# Patient Record
Sex: FEMALE | Race: WHITE | Employment: FULL TIME | URBAN - METROPOLITAN AREA
[De-identification: names, ages, dates, MRNs, and addresses within clinical notes are randomized per-mention and may not be internally consistent; named-entity substitution may affect disease eponyms.]

---

## 2019-08-25 ENCOUNTER — HOSPITAL ENCOUNTER (EMERGENCY)
Facility: CLINIC | Age: 24
Discharge: HOME | End: 2019-08-25
Attending: EMERGENCY MEDICINE | Admitting: EMERGENCY MEDICINE
Payer: COMMERCIAL

## 2019-08-25 VITALS
WEIGHT: 190 LBS | HEIGHT: 70 IN | OXYGEN SATURATION: 97 % | BODY MASS INDEX: 27.2 KG/M2 | TEMPERATURE: 98.2 F | DIASTOLIC BLOOD PRESSURE: 90 MMHG | SYSTOLIC BLOOD PRESSURE: 141 MMHG | RESPIRATION RATE: 20 BRPM
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
## 2019-08-25 PROCEDURE — 99283 EMERGENCY DEPT VISIT LOW MDM: CPT

## 2019-08-25 PROCEDURE — 12002 RPR S/N/AX/GEN/TRNK2.6-7.5CM: CPT

## 2019-08-25 RX ORDER — CLINDAMYCIN HCL 300 MG
300 CAPSULE ORAL ONCE
Status: DISCONTINUED | OUTPATIENT
Start: 2019-08-25 | End: 2019-08-25 | Stop reason: HOSPADM

## 2019-08-25 RX ORDER — CIPROFLOXACIN 500 MG/1
500 TABLET, FILM COATED ORAL ONCE
Status: DISCONTINUED | OUTPATIENT
Start: 2019-08-25 | End: 2019-08-25 | Stop reason: HOSPADM

## 2019-08-25 RX ORDER — CLINDAMYCIN HCL 300 MG
300 CAPSULE ORAL 4 TIMES DAILY
Qty: 20 CAPSULE | Refills: 0 | Status: SHIPPED | OUTPATIENT
Start: 2019-08-25 | End: 2019-08-30

## 2019-08-25 RX ORDER — CIPROFLOXACIN 500 MG/1
500 TABLET, FILM COATED ORAL 2 TIMES DAILY
Qty: 10 TABLET | Refills: 0 | Status: SHIPPED | OUTPATIENT
Start: 2019-08-25

## 2019-08-25 ASSESSMENT — ENCOUNTER SYMPTOMS
NUMBNESS: 0
WEAKNESS: 0

## 2019-08-25 ASSESSMENT — MIFFLIN-ST. JEOR: SCORE: 1692.08

## 2019-08-25 NOTE — ED AVS SNAPSHOT
Emergency Department  [address] [place] [id number]  Phone:  [phone]  Fax:  [phone]                                    Carol Foreman   MRN: [id number]    Department:   Emergency Department   Date of Visit:  8/25/2019           After Visit Summary Signature Page    I have received my discharge instructions, and my questions have been answered. I have discussed any challenges I see with this plan with the nurse or doctor.    ..........................................................................................................................................  Patient/Patient Representative Signature      ..........................................................................................................................................  Patient Representative Print Name and Relationship to Patient    ..................................................               ................................................  Date                                   Time    ..........................................................................................................................................  Reviewed by Signature/Title    ...................................................              ..............................................  Date                                               Time          22EPIC Rev 08/18

## 2019-08-25 NOTE — ED PROVIDER NOTES
"  History     Chief Complaint:  Dog Bite    HPI:   The history is provided by the patient.      Carol Foreman is a 24 year old female who presents for evaluation of a dog bite. The patient states that 30 minutes ago, she was trying to break up her friend's dogs from fighting when one of the dogs bit her left forearm. She states the dog is up to date on immunizations. The patient's Tetanus is up to date. She denies numbness or weakness to the arm, hand, or fingers. She denies any additional injuries.     Allergies:  Penicillins      Medications:    Norgestimate-ethinyl estradiol   Albuterol     Past Medical History:    Mild intermittent asthma     Past Surgical History:    History reviewed. No pertinent surgical history.     Family History:    History reviewed. No pertinent family history.      Social History:  No PCP  Smoking status: Never  Alcohol use: negative  Drug use: negative       Review of Systems   Skin:        Dog bite to left arm   Neurological: Negative for weakness and numbness.   All other systems reviewed and are negative.    Physical Exam     Patient Vitals for the past 24 hrs:   BP Temp Temp src Heart Rate Resp SpO2 Height Weight   08/25/19 1246 (!) 141/90 98.2  F (36.8  C) Oral 100 20 97 % 1.778 m (5' 10\") 86.2 kg (190 lb)        Physical Exam  Consitutional: White female supine  MSK: left arm 3 cm laceration full thickness over distal volar forearm. Distal CMS intact. Strong radial pulse. No foreign body seen.   Neuro: awake alert appropriate. GCS 15.     Emergency Department Course     Procedures:   Lac repair: 0.5% Sensorcaine with epi was used for local anesthesia. The wound was cleaned with Shur Cleans and sterilely irrigated. Explored to the base and no foreign body seen. The skin was then closed with 4.0 prolene simple. Bacitracin and gauze were used as the dressing.     Interventions:  Ordered: Clindamycin 300 mg, PO  Ordered: Ciprofloxacin 500 mg, PO      Emergency Department " Course:  Past medical records, nursing notes, and vitals reviewed.  1300: I performed an exam of the patient and obtained history, as documented above.      1345: I repaired the laceration, as per procedure note above.     I rechecked the patient. Findings and plan explained to the Patient. Patient discharged home with instructions regarding supportive care, medications, and reasons to return. The importance of close follow-up was reviewed.      Impression & Plan      Medical Decision Making:  Carol Foreman is a 24 year old female who was breaking up a fight between two pit bulls and she was bit on the left forearm. She denies distal numbness or weakness or foreign body sensation. Her tetanus is up to date and the dogs' shots are all up to date as well.     The patient has an allergy to Penicillin. I will start her on clindamycin and ciprofloxacin for 5 days and follow up for any sign of infection sutures should be out in 10 days.     Diagnosis:    ICD-10-CM    1. Dog bite, initial encounter W54.0XXA        Disposition:  discharged to home    Discharge Medications:  New Prescriptions    CIPROFLOXACIN (CIPRO) 500 MG TABLET    Take 1 tablet (500 mg) by mouth 2 times daily    CLINDAMYCIN (CLEOCIN) 300 MG CAPSULE    Take 1 capsule (300 mg) by mouth 4 times daily for 5 days     I, Megan Beh, leland serving as a scribe at 1:00 PM on 8/25/2019 to document services personally performed by Juan A Maldonado MD based on my observations and the provider's statements to me.      8/25/2019    EMERGENCY DEPARTMENT       Juan A Maldonado MD  08/25/19 4252